# Patient Record
Sex: MALE | Race: WHITE | NOT HISPANIC OR LATINO | Employment: FULL TIME | RURAL
[De-identification: names, ages, dates, MRNs, and addresses within clinical notes are randomized per-mention and may not be internally consistent; named-entity substitution may affect disease eponyms.]

---

## 2023-02-23 ENCOUNTER — TELEPHONE (OUTPATIENT)
Dept: CARDIOLOGY | Facility: CLINIC | Age: 83
End: 2023-02-23
Payer: MEDICARE

## 2023-02-23 NOTE — TELEPHONE ENCOUNTER
Patient stopped in to discuss next FU. He was scheduled for yesterday to have a pacemaker check but it was cancelled by the office.  He says he cannot wait until April 19th when he was rescheduled. He passed out about a month ago and has been having pains around his heart that come and go.

## 2023-02-27 ENCOUNTER — OFFICE VISIT (OUTPATIENT)
Dept: CARDIOLOGY | Facility: CLINIC | Age: 83
End: 2023-02-27
Payer: MEDICARE

## 2023-02-27 VITALS
HEART RATE: 65 BPM | OXYGEN SATURATION: 96 % | HEIGHT: 68 IN | SYSTOLIC BLOOD PRESSURE: 130 MMHG | WEIGHT: 198 LBS | BODY MASS INDEX: 30.01 KG/M2 | DIASTOLIC BLOOD PRESSURE: 42 MMHG

## 2023-02-27 DIAGNOSIS — I48.0 PAROXYSMAL ATRIAL FIBRILLATION: ICD-10-CM

## 2023-02-27 DIAGNOSIS — R07.2 PRECORDIAL CHEST PAIN: ICD-10-CM

## 2023-02-27 DIAGNOSIS — R55 SYNCOPE AND COLLAPSE: ICD-10-CM

## 2023-02-27 DIAGNOSIS — I10 HYPERTENSION, UNSPECIFIED TYPE: ICD-10-CM

## 2023-02-27 DIAGNOSIS — Z95.0 PACEMAKER: Primary | ICD-10-CM

## 2023-02-27 DIAGNOSIS — N18.31 STAGE 3A CHRONIC KIDNEY DISEASE: ICD-10-CM

## 2023-02-27 DIAGNOSIS — I50.22 CHRONIC SYSTOLIC HEART FAILURE: ICD-10-CM

## 2023-02-27 PROBLEM — Z86.010 HISTORY OF COLONIC POLYPS: Status: ACTIVE | Noted: 2021-06-24

## 2023-02-27 PROBLEM — I47.1 SUPRAVENTRICULAR TACHYCARDIA: Status: ACTIVE | Noted: 2023-01-26

## 2023-02-27 PROBLEM — N40.0 ENLARGED PROSTATE: Status: ACTIVE | Noted: 2021-10-07

## 2023-02-27 PROBLEM — E66.3 OVERWEIGHT: Status: ACTIVE | Noted: 2019-12-12

## 2023-02-27 PROBLEM — Z12.11 ENCOUNTER FOR SCREENING FOR MALIGNANT NEOPLASM OF COLON: Status: ACTIVE | Noted: 2019-09-26

## 2023-02-27 PROBLEM — C44.310 BASAL CELL CARCINOMA OF FACE: Status: ACTIVE | Noted: 2019-09-26

## 2023-02-27 PROBLEM — Z86.0100 HISTORY OF COLONIC POLYPS: Status: ACTIVE | Noted: 2021-06-24

## 2023-02-27 PROBLEM — R50.9 PYREXIA OF UNKNOWN ORIGIN: Status: ACTIVE | Noted: 2022-03-15

## 2023-02-27 PROBLEM — R35.1 NOCTURIA: Status: ACTIVE | Noted: 2021-10-07

## 2023-02-27 PROBLEM — Z91.81 HISTORY OF FALL: Status: ACTIVE | Noted: 2019-09-26

## 2023-02-27 PROBLEM — R47.9 DIFFICULTY SPEAKING: Status: ACTIVE | Noted: 2017-06-29

## 2023-02-27 PROBLEM — I47.10 SUPRAVENTRICULAR TACHYCARDIA: Status: ACTIVE | Noted: 2023-01-26

## 2023-02-27 PROCEDURE — 99214 OFFICE O/P EST MOD 30 MIN: CPT | Performed by: INTERNAL MEDICINE

## 2023-02-27 PROCEDURE — 93280 PM DEVICE PROGR EVAL DUAL: CPT | Performed by: INTERNAL MEDICINE

## 2023-02-27 RX ORDER — BUMETANIDE 1 MG/1
0.5 TABLET ORAL DAILY
Qty: 90 TABLET | Refills: 3 | Status: SHIPPED | OUTPATIENT
Start: 2023-02-27

## 2023-02-27 RX ORDER — AMIODARONE HYDROCHLORIDE 200 MG/1
TABLET ORAL EVERY 24 HOURS
COMMUNITY

## 2023-02-27 RX ORDER — LOSARTAN POTASSIUM 100 MG/1
TABLET ORAL EVERY 24 HOURS
COMMUNITY

## 2023-02-27 RX ORDER — DILTIAZEM HYDROCHLORIDE 300 MG/1
CAPSULE, EXTENDED RELEASE ORAL EVERY 24 HOURS
COMMUNITY

## 2023-02-27 RX ORDER — BUMETANIDE 1 MG/1
TABLET ORAL EVERY 24 HOURS
COMMUNITY
End: 2023-02-27 | Stop reason: SDUPTHER

## 2023-02-27 NOTE — PROGRESS NOTES
[facility] Provider Note     Date:   2023   Name: Jeremias Cross  :   1940  PCP: Gaby Rosen MD    Chief Complaint   Patient presents with   • Follow-up     Pacemaker check    • Loss of Consciousness       Subjective     History of Present Illness  Jeremias Cross is a 82 y.o. male who presents today for routine follow-up.  Pacemaker check shows good battery life and lead function.  He reports an episode of syncope a few weeks ago.  He had some A-fib on his pacemaker check but this did not correlate with the day he passed out.  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  He stumbled and almost fell but was able to catch himself.  He also had some chest pain intermittently lately.  He describes it as a stinging pain above his left breast.  He has not been short of breath.    No Known Allergies    Current Outpatient Medications:   •  amiodarone (PACERONE) 200 MG tablet, Daily., Disp: , Rfl:   •  apixaban (ELIQUIS) 2.5 MG tablet tablet, Every 12 (Twelve) Hours., Disp: , Rfl:   •  bumetanide (BUMEX) 1 MG tablet, Take 0.5 tablets by mouth Daily., Disp: 90 tablet, Rfl: 3  •  dilTIAZem (TIAZAC) 300 MG 24 hr capsule, Daily., Disp: , Rfl:   •  losartan (COZAAR) 100 MG tablet, Daily., Disp: , Rfl:   •  potassium chloride in dextrose solution, potassium chloride  Take 10 MEQ po daily, Disp: , Rfl:     Past Medical History:   Diagnosis Date   • Cataracts, bilateral    • Congestive heart failure (HCC)    • Hiatal hernia    • Hypertension    • Kidney disease    • Other hypersomnia    • Prostate disorder    • Skin cancer       Past Surgical History:   Procedure Laterality Date   • INGUINAL HERNIA REPAIR Bilateral    • PARATHYROIDECTOMY     • PROSTATECTOMY     • SKIN CANCER EXCISION     • TONSILLECTOMY       Family History   Problem Relation Age of Onset   • Asthma Mother    • Emphysema Mother    • Hypertension Brother    • Benign prostatic hyperplasia  "Brother    • Arthritis Brother      Social History     Socioeconomic History   • Marital status: Single   Tobacco Use   • Smoking status: Never   Substance and Sexual Activity   • Alcohol use: Not Currently   • Drug use: Not Currently       Objective     Vital Signs:  /42 (BP Location: Left arm)   Pulse 65   Ht 172.7 cm (68\")   Wt 89.8 kg (198 lb)   SpO2 96%   BMI 30.11 kg/m²   Estimated body mass index is 30.11 kg/m² as calculated from the following:    Height as of this encounter: 172.7 cm (68\").    Weight as of this encounter: 89.8 kg (198 lb).             Physical Exam  Constitutional:       Appearance: Normal appearance. He is well-developed.   HENT:      Head: Normocephalic and atraumatic.   Eyes:      General: No scleral icterus.     Pupils: Pupils are equal, round, and reactive to light.   Neck:      Vascular: No carotid bruit.   Cardiovascular:      Rate and Rhythm: Normal rate and regular rhythm.      Pulses: Normal pulses.           Radial pulses are 2+ on the right side and 2+ on the left side.        Dorsalis pedis pulses are 2+ on the right side and 2+ on the left side.        Posterior tibial pulses are 2+ on the right side and 2+ on the left side.      Heart sounds: Normal heart sounds. No murmur heard.  Pulmonary:      Breath sounds: Normal breath sounds. No wheezing or rhonchi.   Musculoskeletal:      Right lower leg: No edema.      Left lower leg: No edema.   Skin:     Capillary Refill: Capillary refill takes less than 2 seconds.      Coloration: Skin is not cyanotic.      Nails: There is no clubbing.   Neurological:      Mental Status: He is alert and oriented to person, place, and time.      Motor: No weakness.      Gait: Gait normal.   Psychiatric:         Mood and Affect: Mood normal.         Behavior: Behavior is cooperative.         Thought Content: Thought content normal.         Cognition and Memory: Memory normal.                     Assessment and Plan     Diagnoses and all " orders for this visit:    1. Pacemaker (Primary)  Assessment & Plan:  [facility] pacemaker in place. Good battery life and lead function on interrogation today no changes made.      2. Syncope and collapse  Assessment & Plan:  Unclear cause.  Check CT head.  Pacemaker working well.  Going down on diuretics.  Blood pressure stable.  Order labs.    Orders:  -     CT Head With & Without Contrast; Future  -     Stress Test With Myocardial Perfusion One Day; Future  -     Comprehensive Metabolic Panel; Future  -     CBC & Differential; Future  -     TSH Rfx On Abnormal To Free T4; Future    3. Paroxysmal atrial fibrillation (HCC)  Assessment & Plan:  On anticoagulation.  Brief episodes on pacemaker.  Fairly asymptomatic.  Does not correlate to his syncope.    Orders:  -     CT Head With & Without Contrast; Future  -     Stress Test With Myocardial Perfusion One Day; Future  -     Comprehensive Metabolic Panel; Future  -     CBC & Differential; Future  -     TSH Rfx On Abnormal To Free T4; Future    4. Hypertension, unspecified type  Assessment & Plan:  At goal.  Continue plan of care.      5. Stage 3a chronic kidney disease (HCC)  -     bumetanide (BUMEX) 1 MG tablet; Take 0.5 tablets by mouth Daily.  Dispense: 90 tablet; Refill: 3    6. Chronic systolic heart failure (HCC)  Assessment & Plan:  March 7, 2022 TTE reviewed which shows moderate to severe mitral regurgitation, thickened mitral valve leaflets mild, mildly thick aortic valve leaflets, no aortic regurgitation, moderate tricuspid regurgitation, mildly dilated left atrium, mild left ventricular hypertrophy, EF 45%.  Update next visit.    Orders:  -     bumetanide (BUMEX) 1 MG tablet; Take 0.5 tablets by mouth Daily.  Dispense: 90 tablet; Refill: 3    7. Precordial chest pain  Assessment & Plan:  Intermittent chest discomfort.  Pinpoint sticking.  We will update stress testing.  Has not had 1 prior to pacemaker because was placed more urgently.    Orders:  -      Stress Test With Myocardial Perfusion One Day; Future                Follow Up  Return in about 2 weeks (around 3/13/2023) for Recheck follow up after testing.  Patient was given instructions and counseling regarding his condition or for health maintenance advice. Please see specific information pulled into the AVS if appropriate.

## 2023-02-27 NOTE — ASSESSMENT & PLAN NOTE
March 7, 2022 TTE reviewed which shows moderate to severe mitral regurgitation, thickened mitral valve leaflets mild, mildly thick aortic valve leaflets, no aortic regurgitation, moderate tricuspid regurgitation, mildly dilated left atrium, mild left ventricular hypertrophy, EF 45%.  Update next visit.

## 2023-02-27 NOTE — ASSESSMENT & PLAN NOTE
[facility] pacemaker in place. Good battery life and lead function on interrogation today no changes made.

## 2023-02-27 NOTE — ASSESSMENT & PLAN NOTE
Intermittent chest discomfort.  Pinpoint sticking.  We will update stress testing.  Has not had 1 prior to pacemaker because was placed more urgently.

## 2023-02-27 NOTE — ASSESSMENT & PLAN NOTE
On anticoagulation.  Brief episodes on pacemaker.  Fairly asymptomatic.  Does not correlate to his syncope.

## 2023-02-27 NOTE — ASSESSMENT & PLAN NOTE
Unclear cause.  Check CT head.  Pacemaker working well.  Going down on diuretics.  Blood pressure stable.  Order labs.

## 2023-03-01 ENCOUNTER — OUTSIDE FACILITY SERVICE (OUTPATIENT)
Dept: CARDIOLOGY | Facility: CLINIC | Age: 83
End: 2023-03-01
Payer: MEDICARE

## 2023-03-01 DIAGNOSIS — I48.0 PAROXYSMAL ATRIAL FIBRILLATION: ICD-10-CM

## 2023-03-01 DIAGNOSIS — R55 SYNCOPE AND COLLAPSE: ICD-10-CM

## 2023-03-01 PROCEDURE — 93018 CV STRESS TEST I&R ONLY: CPT | Performed by: INTERNAL MEDICINE

## 2023-03-01 PROCEDURE — 78452 HT MUSCLE IMAGE SPECT MULT: CPT | Performed by: INTERNAL MEDICINE

## 2023-04-19 ENCOUNTER — OFFICE VISIT (OUTPATIENT)
Dept: CARDIOLOGY | Facility: CLINIC | Age: 83
End: 2023-04-19
Payer: MEDICARE

## 2023-04-19 VITALS
HEIGHT: 68 IN | SYSTOLIC BLOOD PRESSURE: 138 MMHG | OXYGEN SATURATION: 97 % | DIASTOLIC BLOOD PRESSURE: 66 MMHG | WEIGHT: 200 LBS | HEART RATE: 63 BPM | BODY MASS INDEX: 30.31 KG/M2

## 2023-04-19 DIAGNOSIS — G91.9 HYDROCEPHALUS, UNSPECIFIED TYPE: ICD-10-CM

## 2023-04-19 DIAGNOSIS — I50.22 CHRONIC SYSTOLIC HEART FAILURE: ICD-10-CM

## 2023-04-19 DIAGNOSIS — R07.2 PRECORDIAL CHEST PAIN: ICD-10-CM

## 2023-04-19 DIAGNOSIS — I48.0 PAROXYSMAL ATRIAL FIBRILLATION: ICD-10-CM

## 2023-04-19 DIAGNOSIS — R55 SYNCOPE AND COLLAPSE: Primary | ICD-10-CM

## 2023-04-19 DIAGNOSIS — N18.31 STAGE 3A CHRONIC KIDNEY DISEASE: ICD-10-CM

## 2023-04-19 DIAGNOSIS — I34.0 NONRHEUMATIC MITRAL VALVE REGURGITATION: ICD-10-CM

## 2023-04-19 RX ORDER — BUMETANIDE 0.5 MG/1
0.5 TABLET ORAL DAILY
Qty: 90 TABLET | Refills: 3 | Status: SHIPPED | OUTPATIENT
Start: 2023-04-19

## 2023-04-20 NOTE — PROGRESS NOTES
[facility] Provider Note     Date:   2023   Name: Jeremias Cross  :   1940  PCP: Gaby Rosen MD    Chief Complaint   Patient presents with   • Follow-up     Karen results   • Pacemaker Check       Subjective     History of Present Illness  Jeremias Cross is a 82 y.o. male who presents today for follow-up on syncope and chest pain.  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  Pacemaker was checked and showed no battery or lead issues.  He had had some mild stinging chest pain but it has since resolved.  His stress test was normal.  He denies any worsening lower extremity edema or shortness of breath.  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    Cardiology/Sleep History  1. Atrial fibrillation - paroxysmal  2. Sinus pauses s/p DC PM 2022 [facility]  3. Hypertension  4. Congestive heart failure - EF 45% on echo 3/2-  5. Mitral regurgitation- moderate 3/2022  6. Kidney disease    No Known Allergies    Current Outpatient Medications:   •  amiodarone (PACERONE) 200 MG tablet, Daily., Disp: , Rfl:   •  apixaban (ELIQUIS) 2.5 MG tablet tablet, Every 12 (Twelve) Hours., Disp: , Rfl:   •  bumetanide (BUMEX) 0.5 MG tablet, Take 1 tablet by mouth Daily., Disp: 90 tablet, Rfl: 3  •  dilTIAZem (TIAZAC) 300 MG 24 hr capsule, Daily., Disp: , Rfl:   •  losartan (COZAAR) 100 MG tablet, Daily., Disp: , Rfl:   •  potassium chloride in dextrose solution, potassium chloride  Take 10 MEQ po daily, Disp: , Rfl:     Past Medical History:   Diagnosis Date   • Cataracts, bilateral    • Congestive heart failure    • Hiatal hernia    • Hypertension    • Kidney disease    • Other hypersomnia    • Prostate disorder    • Skin cancer       Past Surgical History:   Procedure Laterality Date   • INGUINAL HERNIA REPAIR Bilateral    • PARATHYROIDECTOMY     • PROSTATECTOMY     • SKIN CANCER EXCISION     • TONSILLECTOMY    "    Family History   Problem Relation Age of Onset   • Asthma Mother    • Emphysema Mother    • Hypertension Brother    • Benign prostatic hyperplasia Brother    • Arthritis Brother      Social History     Socioeconomic History   • Marital status: Single   Tobacco Use   • Smoking status: Never   Substance and Sexual Activity   • Alcohol use: Not Currently   • Drug use: Not Currently       Objective     Vital Signs:  /66 (BP Location: Left arm)   Pulse 63   Ht 172.7 cm (68\")   Wt 90.7 kg (200 lb)   SpO2 97%   BMI 30.41 kg/m²   Estimated body mass index is 30.41 kg/m² as calculated from the following:    Height as of this encounter: 172.7 cm (68\").    Weight as of this encounter: 90.7 kg (200 lb).       BMI is >= 30 and <35. (Class 1 Obesity). The following options were offered after discussion;: referral to primary care      Physical Exam  Constitutional:       Appearance: Normal appearance. He is well-developed.   HENT:      Head: Normocephalic and atraumatic.   Eyes:      General: No scleral icterus.     Pupils: Pupils are equal, round, and reactive to light.   Neck:      Vascular: No carotid bruit.   Cardiovascular:      Rate and Rhythm: Normal rate and regular rhythm.      Pulses: Normal pulses.           Radial pulses are 2+ on the right side and 2+ on the left side.        Dorsalis pedis pulses are 2+ on the right side and 2+ on the left side.        Posterior tibial pulses are 2+ on the right side and 2+ on the left side.      Heart sounds: Normal heart sounds. No murmur heard.  Pulmonary:      Breath sounds: Normal breath sounds. No wheezing or rhonchi.   Musculoskeletal:      Right lower leg: No edema.      Left lower leg: No edema.   Skin:     Capillary Refill: Capillary refill takes less than 2 seconds.      Coloration: Skin is not cyanotic.      Nails: There is no clubbing.   Neurological:      Mental Status: He is alert and oriented to person, place, and time.      Motor: No weakness.      " Gait: Gait normal.   Psychiatric:         Mood and Affect: Mood normal.         Behavior: Behavior is cooperative.         Thought Content: Thought content normal.         Cognition and Memory: Memory normal.           Radiologic studies reviewed: CT head from [facility] and Labs reviewed: Lab work from [facility]          Assessment and Plan     Diagnoses and all orders for this visit:    1. Syncope and collapse (Primary)  Comments:  Cause remains unclear.  Abnormal CT head will refer to neurology.  See below.  Orders:  -     Ambulatory Referral to Neurology    2. Hydrocephalus, unspecified type  Comments:  Unclear if this is age-related atrophy or true hydrocephalus.  Refer to neurology for further evaluation.  Orders:  -     Ambulatory Referral to Neurology    3. Precordial chest pain  Comments:  Normal nuclear stress test.  Pain is resolved.  Feel like this is noncardiac.    4. Stage 3a chronic kidney disease  Comments:  Stable.  Makes angiography less desirable.  Orders:  -     bumetanide (BUMEX) 0.5 MG tablet; Take 1 tablet by mouth Daily.  Dispense: 90 tablet; Refill: 3    5. Chronic systolic heart failure  Comments:  Volume status stable.  Need follow-up echo at next appointment.  Orders:  -     bumetanide (BUMEX) 0.5 MG tablet; Take 1 tablet by mouth Daily.  Dispense: 90 tablet; Refill: 3    6. Nonrheumatic mitral valve regurgitation  Comments:  Follow-up echo at next appointment.  Orders:  -     Adult Transthoracic Echo Complete W/ Cont if Necessary Per Protocol; Future    7. Paroxysmal atrial fibrillation  Comments:  Only 2% A-fib with one 9-hour episode.  Continue amiodarone.  May try to wean off at next visit.  Labs reviewed.        Recommendations: ER if symptoms increase and Report if any new/changing symptoms immediately          Follow Up  Return in about 4 months (around 8/19/2023) for PM/ICD check [facility].  Patient was given instructions and counseling regarding his condition or for  health maintenance advice. Please see specific information pulled into the AVS if appropriate.

## 2023-04-21 DIAGNOSIS — R55 SYNCOPE AND COLLAPSE: ICD-10-CM

## 2023-04-21 DIAGNOSIS — I48.0 PAROXYSMAL ATRIAL FIBRILLATION: ICD-10-CM

## 2023-08-23 ENCOUNTER — OFFICE VISIT (OUTPATIENT)
Dept: NEUROLOGY | Facility: CLINIC | Age: 83
End: 2023-08-23
Payer: MEDICARE

## 2023-08-23 VITALS
OXYGEN SATURATION: 96 % | DIASTOLIC BLOOD PRESSURE: 64 MMHG | SYSTOLIC BLOOD PRESSURE: 138 MMHG | WEIGHT: 200 LBS | HEIGHT: 68 IN | BODY MASS INDEX: 30.31 KG/M2 | HEART RATE: 51 BPM

## 2023-08-23 DIAGNOSIS — R90.89 ABNORMAL CT OF BRAIN: Primary | ICD-10-CM

## 2023-08-23 PROBLEM — J81.0 ACUTE PULMONARY EDEMA: Status: ACTIVE | Noted: 2022-03-15

## 2023-08-23 PROCEDURE — 1159F MED LIST DOCD IN RCRD: CPT | Performed by: PSYCHIATRY & NEUROLOGY

## 2023-08-23 PROCEDURE — 1160F RVW MEDS BY RX/DR IN RCRD: CPT | Performed by: PSYCHIATRY & NEUROLOGY

## 2023-08-23 PROCEDURE — 3078F DIAST BP <80 MM HG: CPT | Performed by: PSYCHIATRY & NEUROLOGY

## 2023-08-23 PROCEDURE — 99204 OFFICE O/P NEW MOD 45 MIN: CPT | Performed by: PSYCHIATRY & NEUROLOGY

## 2023-08-23 PROCEDURE — 3075F SYST BP GE 130 - 139MM HG: CPT | Performed by: PSYCHIATRY & NEUROLOGY

## 2023-08-23 RX ORDER — POTASSIUM CHLORIDE 750 MG/1
10 TABLET, FILM COATED, EXTENDED RELEASE ORAL ONCE
COMMUNITY

## 2023-08-23 NOTE — PROGRESS NOTES
Subjective:    CC: Jeremias Cross is seen today in consultation at the request of Negin Valera MD for Syncope (With fall) and hydrocephalus       HPI:  Patient is a 82-year-old male with past medical history of paroxysmal atrial fibrillation, history of pacemaker placement referred to the clinic for evaluation of possible hydrocephalus and abnormal CT.  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  He reports that he remembers going down to the ground and once he was not drunk, within few seconds, he regained consciousness.  He did not have any associated tongue bite or loss of bowel or bladder control.  He did not have any associated confusion after the episode.  Following this, he reports that he has not had any more episodes of passing out.  Because of this episode, he underwent CT head without contrast at [facility].  I do not have actual images for me to review but based on the report, it showed dilatation of ventricles out of proportion to brain atrophy concerning for hydrocephalus.  Today upon further questioning, he denies any problems with walking, balance issues, no problems with memory and denies any problems with urinary control.    The following portions of the patient's history were reviewed today and updated as of 08/23/2023  : allergies, social history, and problem list.  This document will be scanned to patient's chart.      Current Outpatient Medications:     amiodarone (PACERONE) 200 MG tablet, Daily., Disp: , Rfl:     apixaban (ELIQUIS) 2.5 MG tablet tablet, Every 12 (Twelve) Hours., Disp: , Rfl:     bumetanide (BUMEX) 0.5 MG tablet, Take 1 tablet by mouth Daily., Disp: 90 tablet, Rfl: 3    dilTIAZem (TIAZAC) 300 MG 24 hr capsule, Daily., Disp: , Rfl:     losartan (COZAAR) 100 MG tablet, Daily., Disp: , Rfl:     potassium chloride 10 MEQ CR tablet, Take 1 tablet by mouth 1 (One) Time., Disp: , Rfl:    Past  "Medical History:   Diagnosis Date    Cataracts, bilateral     Congestive heart failure     Hiatal hernia     Hypertension     Kidney disease     Other hypersomnia     Prostate disorder     Skin cancer       Past Surgical History:   Procedure Laterality Date    INGUINAL HERNIA REPAIR Bilateral     PARATHYROIDECTOMY      PROSTATECTOMY      SKIN CANCER EXCISION      TONSILLECTOMY        Family History   Problem Relation Age of Onset    Asthma Mother     Emphysema Mother     Hypertension Brother     Benign prostatic hyperplasia Brother     Arthritis Brother       Review of Systems    All other systems reviewed and are negative     Objective:    /64   Pulse 51   Ht 172.7 cm (67.99\")   Wt 90.7 kg (200 lb) Comment: pt reported  SpO2 96%   BMI 30.42 kg/mý     Neurology Exam:    General apperance: NAD.     Mental status: Alert, awake and oriented to time place and person.    Language and Speech: No aphasia or dysarthria.    Naming , Repitition and Comprehension:  Can name objects, repeat a sentence and follow commands. Speech is clear and fluent with good repetition, comprehension, and naming.    Cranial Nerves:   CN II: Visual fields are full. Intact. Fundi - Normal, No papillederma, Pupils - CANDIDA  CN III, IV and VI: Extraocular movements are intact. Normal saccades.   CN V: Facial sensation is intact.   CN VII: Muscles of facial expression reveal no asymmetry. Intact.   CN VIII: Hearing is intact. Whispered voice intact.   CN IX and X: Palate elevates symmetrically. Intact  CN XI: Shoulder shrug is intact.   CN XII: Tongue is midline without evidence of atrophy or fasciculation.     Motor:  Right UE muscle strength 5/5. Normal tone.     Left UE muscle strength 5/5. Normal tone.      Right LE muscle strength5/5. Normal tone.     Left LE muscle strength 5/5. Normal tone.      Sensory: Normal light touch, vibration and pinprick sensation bilaterally.    DTRs: 2+ bilaterally in upper and lower " extremities.    Babinski: Negative bilaterally.    Co-ordination: Normal finger-to-nose, heel to shin B/L.    Rhomberg: Negative.    Gait: Normal.    Cardiovascular: Regular rate and rhythm without murmur, gallop or rub.    Assessment and Plan:  1. Abnormal CT of brain  Patient with an episode of syncope back in January/February 2023.  Unknown etiology.  He does have a pacemaker and pacemaker interrogation did not reveal any malfunctioning during that time.  Based on the symptoms, I have assured him that he did not have a seizure.  He underwent CT head without contrast in April 2023 because of the syncopal episode.  I do not have actual images for me to review but based on the report, CT head showed ventricular megaly out of proportion to brain atrophy concerning for hydrocephalus.  On my examination, he did not have any signs suggestive of normal pressure hydrocephalus and hence I do not recommend any further neuroimaging or testing at this point in time.  I would like to see him back in clinic in 1 year for follow-up.    Return in about 1 year (around 8/23/2024).     Constantin Ricardo MD      Note to patient: The 21st Century Cures Act makes medical notes like these available to patients in the interest of transparency. However, be advised this is a medical document. It is intended as peer to peer communication. It is written in medical language and may contain abbreviations or verbiage that are unfamiliar. It may appear blunt or direct. Medical documents are intended to carry relevant information, facts as evident, and the clinical opinion of the physician.

## 2023-09-06 ENCOUNTER — OFFICE VISIT (OUTPATIENT)
Dept: CARDIOLOGY | Facility: CLINIC | Age: 83
End: 2023-09-06
Payer: MEDICARE

## 2023-09-06 VITALS
BODY MASS INDEX: 30.62 KG/M2 | OXYGEN SATURATION: 96 % | WEIGHT: 202 LBS | HEART RATE: 65 BPM | DIASTOLIC BLOOD PRESSURE: 67 MMHG | SYSTOLIC BLOOD PRESSURE: 138 MMHG | HEIGHT: 68 IN

## 2023-09-06 DIAGNOSIS — R55 SYNCOPE AND COLLAPSE: ICD-10-CM

## 2023-09-06 DIAGNOSIS — Z95.0 PACEMAKER: ICD-10-CM

## 2023-09-06 DIAGNOSIS — I48.0 PAROXYSMAL ATRIAL FIBRILLATION: Primary | ICD-10-CM

## 2023-09-06 DIAGNOSIS — R07.2 PRECORDIAL CHEST PAIN: ICD-10-CM

## 2023-09-06 NOTE — PROGRESS NOTES
[facility] Provider Note     Date:   2023   Name: Jeremias Cross  :   1940  PCP: System, Provider Not In    Chief Complaint   Patient presents with    Pacemaker Check       Subjective     History of Present Illness  Jeremias Cross is a 82 y.o. male who presents today for follow up sycope and atrial fibrillation. He does have some chest pain on the left side when coughs and when he moves. No syncope. Some shortness of breath with exertion.  No worse. No LE edema.  A-fib burden 2%.  Longest episode 5-1/2 hours.  No high ventricular rates.  We will stop amiodarone.    Cardiology/Sleep History  1. Atrial fibrillation - paroxysmal  2. Sinus pauses s/p DC PM 2022 [facility]  3. Hypertension  4. Congestive heart failure - EF 45% on echo 3/2-  5. Mitral regurgitation- moderate 3/2022  6. Kidney disease    No Known Allergies    Current Outpatient Medications:     apixaban (ELIQUIS) 2.5 MG tablet tablet, Every 12 (Twelve) Hours., Disp: , Rfl:     bumetanide (BUMEX) 0.5 MG tablet, Take 1 tablet by mouth Daily., Disp: 90 tablet, Rfl: 3    dilTIAZem (TIAZAC) 300 MG 24 hr capsule, Daily., Disp: , Rfl:     losartan (COZAAR) 100 MG tablet, Daily., Disp: , Rfl:     potassium chloride 10 MEQ CR tablet, Take 1 tablet by mouth 1 (One) Time., Disp: , Rfl:     Past Medical History:   Diagnosis Date    Cataracts, bilateral     Congestive heart failure     Hiatal hernia     Hypertension     Kidney disease     Other hypersomnia     Prostate disorder     Skin cancer       Past Surgical History:   Procedure Laterality Date    INGUINAL HERNIA REPAIR Bilateral     PARATHYROIDECTOMY      PROSTATECTOMY      SKIN CANCER EXCISION      TONSILLECTOMY       Family History   Problem Relation Age of Onset    Asthma Mother     Emphysema Mother     Hypertension Brother     Benign prostatic hyperplasia Brother     Arthritis Brother      Social History     Socioeconomic History    Marital status: Single   Tobacco Use     "Smoking status: Never     Passive exposure: Never    Smokeless tobacco: Never   Vaping Use    Vaping Use: Never used   Substance and Sexual Activity    Alcohol use: Not Currently    Drug use: Not Currently    Sexual activity: Defer       Objective     Vital Signs:  /67   Pulse 65   Ht 172.7 cm (68\")   Wt 91.6 kg (202 lb)   SpO2 96%   BMI 30.71 kg/m²   Estimated body mass index is 30.71 kg/m² as calculated from the following:    Height as of this encounter: 172.7 cm (68\").    Weight as of this encounter: 91.6 kg (202 lb).               Physical Exam  Vitals reviewed.   Constitutional:       General: He is not in acute distress.     Appearance: Normal appearance.   HENT:      Head: Normocephalic and atraumatic.      Mouth/Throat:      Mouth: Mucous membranes are moist.   Eyes:      Conjunctiva/sclera: Conjunctivae normal.   Neck:      Vascular: No carotid bruit.   Cardiovascular:      Rate and Rhythm: Normal rate and regular rhythm.      Pulses: Normal pulses.      Heart sounds: Normal heart sounds. No murmur heard.  Pulmonary:      Effort: Pulmonary effort is normal. No respiratory distress.      Breath sounds: Normal breath sounds. No wheezing or rhonchi.   Abdominal:      General: Abdomen is flat.      Palpations: Abdomen is soft.   Musculoskeletal:      Cervical back: Normal range of motion and neck supple.      Right lower leg: No edema.      Left lower leg: No edema.   Skin:     General: Skin is warm and dry.      Coloration: Skin is not jaundiced.   Neurological:      General: No focal deficit present.      Mental Status: He is alert and oriented to person, place, and time. Mental status is at baseline.      GCS: GCS eye subscore is 4. GCS verbal subscore is 5. GCS motor subscore is 6.      Cranial Nerves: No cranial nerve deficit.      Motor: No weakness.      Gait: Gait normal.   Psychiatric:         Mood and Affect: Mood and affect normal. Mood is not anxious.         Speech: Speech normal.        "  Behavior: Behavior normal.                   Assessment and Plan     Diagnoses and all orders for this visit:    1. Paroxysmal atrial fibrillation (Primary)  Comments:  A-fib burden low.  Stop amiodarone.  Risks outweigh benefit    2. Pacemaker  Comments:  Good battery and lead function.  Check for A-fib burden today    3. Precordial chest pain  Comments:  Atypical.  Noncardiac.    4. Syncope and collapse  Comments:  None further.  Watch blood pressures.        Recommendations: ER if symptoms increase and Report if any new/changing symptoms immediately              Follow Up  Return in about 4 months (around 1/6/2024) for PM/ICD check.    Negin Valera MD   09/06/2023     Please note that this explicitly excludes time spent on other separate billable services such as performing procedures or test interpretation, when applicable.    This note was created using dictation software which occasionally transcribes nonsensical phrases. Please contact the provider if any clarification is needed.

## 2023-11-16 ENCOUNTER — TELEPHONE (OUTPATIENT)
Dept: CARDIOLOGY | Facility: CLINIC | Age: 83
End: 2023-11-16
Payer: MEDICARE

## 2023-11-16 NOTE — TELEPHONE ENCOUNTER
Pt here today requesting samples of Eliquis 2.5mg one twice daily.  Last seen by Dr. Valera on 9/6/2023 and on active list; follow up scheduled for 1/17/2024.  Okay to give samples?

## 2024-01-10 ENCOUNTER — TELEPHONE (OUTPATIENT)
Dept: CARDIOLOGY | Facility: CLINIC | Age: 84
End: 2024-01-10

## 2024-01-10 NOTE — TELEPHONE ENCOUNTER
Patient is needing to reschedule his device check for later in the day. Can you call patient and reschedule? Thank you

## 2024-01-10 NOTE — TELEPHONE ENCOUNTER
Name: Jeremisa Cross    Relationship: Self    Best Callback Number: [phone]     Incoming call to the Hub, requesting to  Reschedule their Device Check appointment on 1/17/24.     Per Hub workflow, further review is needed before the task can be completed.    Result of Call: Please reach out to the patient to reschedule    REQUESTING A LATER TIME ON THE SAME DAY

## 2024-02-21 ENCOUNTER — OFFICE VISIT (OUTPATIENT)
Dept: CARDIOLOGY | Facility: CLINIC | Age: 84
End: 2024-02-21
Payer: MEDICARE

## 2024-02-21 VITALS
HEIGHT: 68 IN | HEART RATE: 61 BPM | WEIGHT: 207.9 LBS | OXYGEN SATURATION: 97 % | BODY MASS INDEX: 31.51 KG/M2 | DIASTOLIC BLOOD PRESSURE: 62 MMHG | SYSTOLIC BLOOD PRESSURE: 138 MMHG

## 2024-02-21 DIAGNOSIS — R55 SYNCOPE AND COLLAPSE: ICD-10-CM

## 2024-02-21 DIAGNOSIS — I48.0 PAROXYSMAL ATRIAL FIBRILLATION: Primary | ICD-10-CM

## 2024-02-21 DIAGNOSIS — Z95.0 PACEMAKER: ICD-10-CM

## 2024-02-21 RX ORDER — METOPROLOL SUCCINATE 25 MG/1
25 TABLET, EXTENDED RELEASE ORAL DAILY
Qty: 30 TABLET | Refills: 11 | Status: SHIPPED | OUTPATIENT
Start: 2024-02-21

## 2024-02-21 RX ORDER — MULTIPLE VITAMINS W/ MINERALS TAB 9MG-400MCG
1 TAB ORAL DAILY
COMMUNITY

## 2024-02-21 RX ORDER — ASPIRIN 81 MG/1
81 TABLET, CHEWABLE ORAL DAILY
COMMUNITY

## 2024-02-21 RX ORDER — ALBUTEROL SULFATE 90 UG/1
1 AEROSOL, METERED RESPIRATORY (INHALATION) EVERY 6 HOURS PRN
COMMUNITY
Start: 2023-11-24 | End: 2024-11-23

## 2024-02-21 NOTE — TELEPHONE ENCOUNTER
error   Azithromycin Counseling:  I discussed with the patient the risks of azithromycin including but not limited to GI upset, allergic reaction, drug rash, diarrhea, and yeast infections.

## 2024-02-21 NOTE — PROGRESS NOTES
[facility] Provider Note     Date:   2024   Name: Jeremias Cross  :   1940  PCP: System, Provider Not In    Chief Complaint   Patient presents with    Follow-up     Follow Up with pacemaker check   Patient watched CPR Video        Subjective     History of Present Illness  Jeremias Cross is a 83 y.o. male who presents today for 6-month follow-up and pacemaker check.  Passed out one day back in the fall, none since.  Does not feel dizzy.  Little bit of chest pain but went away when he had RSV in Nov. Now doing well.   Has no idea when he is in A-fib.  He did have 2-1/2-hour episode.  He had average heart rates above 110 during that episode.  No lower extremity edema.  No shortness of breath.    Cardiology/Sleep History  1. Atrial fibrillation - paroxysmal  2. Sinus pauses s/p DC PM 2022 [facility]  3. Hypertension  4. Congestive heart failure - EF 45% on echo 3/2-  5. Mitral regurgitation- moderate 3/2022  6. Kidney disease    No Known Allergies    Current Outpatient Medications:     apixaban (ELIQUIS) 2.5 MG tablet tablet, Take 1 tablet by mouth Every 12 (Twelve) Hours., Disp: 28 tablet, Rfl: 0    bumetanide (BUMEX) 0.5 MG tablet, Take 1 tablet by mouth Daily., Disp: 90 tablet, Rfl: 3    dilTIAZem (TIAZAC) 300 MG 24 hr capsule, Daily., Disp: , Rfl:     losartan (COZAAR) 100 MG tablet, Daily., Disp: , Rfl:     multivitamin with minerals tablet tablet, Take 1 tablet by mouth Daily., Disp: , Rfl:     potassium chloride 10 MEQ CR tablet, Take 1 tablet by mouth 1 (One) Time., Disp: , Rfl:     albuterol sulfate  (90 Base) MCG/ACT inhaler, Inhale 1 puff Every 6 (Six) Hours As Needed. (Patient not taking: Reported on 2024), Disp: , Rfl:     aspirin 81 MG chewable tablet, Chew 1 tablet Daily. Takes every other day, Disp: , Rfl:     metoprolol succinate XL (TOPROL-XL) 25 MG 24 hr tablet, Take 1 tablet by mouth Daily., Disp: 30 tablet, Rfl: 11    Past Medical History:  "  Diagnosis Date    Cataracts, bilateral     Congestive heart failure     Hiatal hernia     Hypertension     Kidney disease     Other hypersomnia     Prostate disorder     Skin cancer       Past Surgical History:   Procedure Laterality Date    INGUINAL HERNIA REPAIR Bilateral     PARATHYROIDECTOMY      PROSTATECTOMY      SKIN CANCER EXCISION      TONSILLECTOMY       Family History   Problem Relation Age of Onset    Asthma Mother     Emphysema Mother     Hypertension Brother     Benign prostatic hyperplasia Brother     Arthritis Brother      Social History     Socioeconomic History    Marital status: Single   Tobacco Use    Smoking status: Never     Passive exposure: Never    Smokeless tobacco: Never   Vaping Use    Vaping Use: Never used   Substance and Sexual Activity    Alcohol use: Not Currently    Drug use: Not Currently    Sexual activity: Defer       Objective     Vital Signs:  /62 (BP Location: Left arm, Patient Position: Sitting, Cuff Size: Adult)   Pulse 61   Ht 172.7 cm (68\")   Wt 94.3 kg (207 lb 14.4 oz)   SpO2 97%   BMI 31.61 kg/m²   Estimated body mass index is 31.61 kg/m² as calculated from the following:    Height as of this encounter: 172.7 cm (68\").    Weight as of this encounter: 94.3 kg (207 lb 14.4 oz).         Physical Exam  Vitals reviewed.   Constitutional:       General: He is not in acute distress.     Appearance: Normal appearance.   HENT:      Head: Normocephalic and atraumatic.      Mouth/Throat:      Mouth: Mucous membranes are moist.   Eyes:      Conjunctiva/sclera: Conjunctivae normal.   Neck:      Vascular: No carotid bruit.   Cardiovascular:      Rate and Rhythm: Normal rate and regular rhythm.      Pulses: Normal pulses.      Heart sounds: Normal heart sounds. No murmur heard.  Pulmonary:      Effort: Pulmonary effort is normal. No respiratory distress.      Breath sounds: Normal breath sounds. No wheezing or rhonchi.   Abdominal:      General: Abdomen is flat.      " Palpations: Abdomen is soft.   Musculoskeletal:      Cervical back: Normal range of motion and neck supple.      Right lower leg: No edema.      Left lower leg: No edema.   Skin:     General: Skin is warm and dry.      Coloration: Skin is not jaundiced.   Neurological:      General: No focal deficit present.      Mental Status: He is alert and oriented to person, place, and time. Mental status is at baseline.      GCS: GCS eye subscore is 4. GCS verbal subscore is 5. GCS motor subscore is 6.      Cranial Nerves: No cranial nerve deficit.      Motor: No weakness.      Gait: Gait normal.   Psychiatric:         Mood and Affect: Mood and affect normal. Mood is not anxious.         Speech: Speech normal.         Behavior: Behavior normal.                     Assessment and Plan     Diagnoses and all orders for this visit:    1. Paroxysmal atrial fibrillation (Primary)  Comments:  Episodes recently have a little bit of elevated heart rate.  Start metoprolol.  He is asymptomatic.  Recheck on pacemaker download 3-month.  Orders:  -     metoprolol succinate XL (TOPROL-XL) 25 MG 24 hr tablet; Take 1 tablet by mouth Daily.  Dispense: 30 tablet; Refill: 11  -     apixaban (ELIQUIS) 2.5 MG tablet tablet; Take 1 tablet by mouth Every 12 (Twelve) Hours.  Dispense: 28 tablet; Refill: 0    2. Pacemaker  Comments:  Good battery life and lead function.    3. Syncope and collapse  Comments:  1 episode more than 6 months ago.  None recently.        Recommendations: Report if any new/changing symptoms immediately      Follow Up  Return in about 3 months (around 5/21/2024) for PM/ICD check.    Negin Valera MD   02/21/2024     Please note that this explicitly excludes time spent on other separate billable services such as performing procedures or test interpretation, when applicable.    This note was created using dictation software which occasionally transcribes nonsensical phrases. Please contact the provider if any clarification is  needed.

## 2024-03-20 ENCOUNTER — TELEPHONE (OUTPATIENT)
Dept: CARDIOLOGY | Facility: CLINIC | Age: 84
End: 2024-03-20
Payer: MEDICARE

## 2024-03-20 NOTE — TELEPHONE ENCOUNTER
Mr. Cross is having skin cancer removed off of his face on Friday and was instructed to not take his blood thinner for 3 days. He is wanting to know if that is okay?

## 2024-05-09 DIAGNOSIS — I50.22 CHRONIC SYSTOLIC HEART FAILURE: ICD-10-CM

## 2024-05-09 DIAGNOSIS — N18.31 STAGE 3A CHRONIC KIDNEY DISEASE: ICD-10-CM

## 2024-05-09 RX ORDER — BUMETANIDE 1 MG/1
0.5 TABLET ORAL DAILY
Qty: 90 TABLET | Refills: 3 | OUTPATIENT
Start: 2024-05-09

## 2024-05-10 RX ORDER — BUMETANIDE 0.5 MG/1
0.5 TABLET ORAL DAILY
Qty: 90 TABLET | Refills: 0 | Status: SHIPPED | OUTPATIENT
Start: 2024-05-10

## 2024-05-29 ENCOUNTER — OFFICE VISIT (OUTPATIENT)
Dept: CARDIOLOGY | Facility: CLINIC | Age: 84
End: 2024-05-29
Payer: MEDICARE

## 2024-05-29 VITALS
DIASTOLIC BLOOD PRESSURE: 64 MMHG | HEIGHT: 68 IN | WEIGHT: 207 LBS | HEART RATE: 75 BPM | BODY MASS INDEX: 31.37 KG/M2 | SYSTOLIC BLOOD PRESSURE: 128 MMHG | OXYGEN SATURATION: 96 %

## 2024-05-29 DIAGNOSIS — I34.0 NONRHEUMATIC MITRAL VALVE REGURGITATION: ICD-10-CM

## 2024-05-29 DIAGNOSIS — Z95.0 PACEMAKER: ICD-10-CM

## 2024-05-29 DIAGNOSIS — I50.22 CHRONIC SYSTOLIC HEART FAILURE: Primary | ICD-10-CM

## 2024-05-29 NOTE — PROGRESS NOTES
[facility] Provider Note     Date:   2024   Name: Jeremias Cross  :   1940  PCP: Gaby Rosen MD    Chief Complaint   Patient presents with    Atrial Fibrillation    Pacemaker Check       Subjective     History of Present Illness  Jeremias Cross is a 83 y.o. male who presents today for routine follow-up.  He reports feeling well.  He went to the kidney doctor recently who talked to him about congestive heart failure mitral regurgitation.  He does not recall us telling him about that.  Apologize for the error.  Not sure where the miscommunication was but discussed with him today that these are things we need to monitor but have not shown any need for intervention at this time.  He also needs to watch blood pressure and continue primary prevention as needed.  He denies any lower extremity edema.  No chest pain or shortness of breath.    Cardiology/Sleep History  1. Atrial fibrillation - paroxysmal  2. Sinus pauses s/p DC PM 2022 [facility]  3. Hypertension  4. Congestive heart failure - EF 45% on echo 3/2-  5. Mitral regurgitation- moderate 3/2022  6. Kidney disease    No Known Allergies    Current Outpatient Medications:     apixaban (ELIQUIS) 2.5 MG tablet tablet, Take 1 tablet by mouth Every 12 (Twelve) Hours., Disp: 28 tablet, Rfl: 0    aspirin 81 MG chewable tablet, Chew 1 tablet Daily. Takes every other day, Disp: , Rfl:     bumetanide (BUMEX) 0.5 MG tablet, TAKE 1 TABLET BY MOUTH EVERY DAY, Disp: 90 tablet, Rfl: 0    dilTIAZem (TIAZAC) 300 MG 24 hr capsule, Daily., Disp: , Rfl:     losartan (COZAAR) 100 MG tablet, Daily., Disp: , Rfl:     metoprolol succinate XL (TOPROL-XL) 25 MG 24 hr tablet, Take 1 tablet by mouth Daily., Disp: 30 tablet, Rfl: 11    multivitamin with minerals tablet tablet, Take 1 tablet by mouth Daily., Disp: , Rfl:     potassium chloride 10 MEQ CR tablet, Take 1 tablet by mouth 1 (One) Time., Disp: , Rfl:     Past Medical History:  "  Diagnosis Date    Cataracts, bilateral     Congestive heart failure     Hiatal hernia     Hypertension     Kidney disease     Other hypersomnia     Prostate disorder     Skin cancer       Past Surgical History:   Procedure Laterality Date    INGUINAL HERNIA REPAIR Bilateral     PARATHYROIDECTOMY      PROSTATECTOMY      SKIN CANCER EXCISION      TONSILLECTOMY       Family History   Problem Relation Age of Onset    Asthma Mother     Emphysema Mother     Hypertension Brother     Benign prostatic hyperplasia Brother     Arthritis Brother      Social History     Socioeconomic History    Marital status: Single   Tobacco Use    Smoking status: Never     Passive exposure: Never    Smokeless tobacco: Never   Vaping Use    Vaping status: Never Used   Substance and Sexual Activity    Alcohol use: Not Currently    Drug use: Not Currently    Sexual activity: Defer       Objective     Vital Signs:  /64 (BP Location: Right arm, Patient Position: Sitting)   Pulse 75   Ht 172.7 cm (68\")   Wt 93.9 kg (207 lb)   SpO2 96%   BMI 31.47 kg/m²   Estimated body mass index is 31.47 kg/m² as calculated from the following:    Height as of this encounter: 172.7 cm (68\").    Weight as of this encounter: 93.9 kg (207 lb).         Physical Exam  Vitals reviewed.   Constitutional:       General: He is not in acute distress.     Appearance: Normal appearance.   HENT:      Head: Normocephalic and atraumatic.      Mouth/Throat:      Mouth: Mucous membranes are moist.   Eyes:      Conjunctiva/sclera: Conjunctivae normal.   Neck:      Vascular: No carotid bruit.   Cardiovascular:      Rate and Rhythm: Normal rate and regular rhythm.      Pulses: Normal pulses.      Heart sounds: Normal heart sounds. No murmur heard.  Pulmonary:      Effort: Pulmonary effort is normal. No respiratory distress.      Breath sounds: Normal breath sounds. No wheezing or rhonchi.   Abdominal:      General: Abdomen is flat.      Palpations: Abdomen is soft. "   Musculoskeletal:      Cervical back: Normal range of motion and neck supple.      Right lower leg: No edema.      Left lower leg: No edema.   Skin:     General: Skin is warm and dry.      Coloration: Skin is not jaundiced.   Neurological:      General: No focal deficit present.      Mental Status: He is alert and oriented to person, place, and time. Mental status is at baseline.      GCS: GCS eye subscore is 4. GCS verbal subscore is 5. GCS motor subscore is 6.      Cranial Nerves: No cranial nerve deficit.      Motor: No weakness.      Gait: Gait normal.   Psychiatric:         Mood and Affect: Mood and affect normal. Mood is not anxious.         Speech: Speech normal.         Behavior: Behavior normal.                     Assessment and Plan     Diagnoses and all orders for this visit:    1. Chronic systolic heart failure (Primary)  Comments:  Euvolemic on exam.  Continue medical therapy.  Orders:  -     Adult Transthoracic Echo Complete W/ Cont if Necessary Per Protocol; Future    2. Nonrheumatic mitral valve regurgitation  Comments:  Moderate.  Due for follow-up at next appointment.  Orders:  -     Adult Transthoracic Echo Complete W/ Cont if Necessary Per Protocol; Future    3. Pacemaker  Comments:  Good battery life and lead function.        Recommendations: Report if any new/changing symptoms immediately      Follow Up  Return in about 3 months (around 8/29/2024) for PM/ICD check.    Negin Valera MD   05/29/2024     Please note that this explicitly excludes time spent on other separate billable services such as performing procedures or test interpretation, when applicable.    This note was created using dictation software which occasionally transcribes nonsensical phrases. Please contact the provider if any clarification is needed.

## 2024-08-15 ENCOUNTER — TELEPHONE (OUTPATIENT)
Dept: CARDIOLOGY | Facility: CLINIC | Age: 84
End: 2024-08-15
Payer: COMMERCIAL

## 2024-08-15 NOTE — TELEPHONE ENCOUNTER
Per remote transmission, patient has had a changed in his RV threshold.  RV threshold today 3.75V @ 0.5ms, impedance 560 ohms, and sensing 12.0mv. Previously threshold 1.1v@ 0.5ms, sensing 12mv and impedance 540 ohms. Only RV paces 7.5% of time. Has upcoming in office appointment 08/21/24 for follow up with device check. Note placed in appt note in regards to recent threshold change. Report in [employer] for review.

## 2024-08-15 NOTE — TELEPHONE ENCOUNTER
Please call the patient and let him know that we have seen some changes on his pacemaker remote report and that it is important that he keep his scheduled clinic appointment where we are going to check the pacemaker in office.

## 2024-08-21 ENCOUNTER — OFFICE VISIT (OUTPATIENT)
Dept: CARDIOLOGY | Facility: CLINIC | Age: 84
End: 2024-08-21
Payer: MEDICARE

## 2024-08-21 VITALS
HEIGHT: 68 IN | SYSTOLIC BLOOD PRESSURE: 122 MMHG | OXYGEN SATURATION: 96 % | BODY MASS INDEX: 30.92 KG/M2 | HEART RATE: 81 BPM | WEIGHT: 204 LBS | DIASTOLIC BLOOD PRESSURE: 60 MMHG

## 2024-08-21 DIAGNOSIS — Z95.0 PACEMAKER: Primary | ICD-10-CM

## 2024-08-21 DIAGNOSIS — I34.0 NONRHEUMATIC MITRAL VALVE REGURGITATION: ICD-10-CM

## 2024-08-21 DIAGNOSIS — I48.0 PAROXYSMAL ATRIAL FIBRILLATION: ICD-10-CM

## 2024-08-21 NOTE — PROGRESS NOTES
[facility] Provider Note     Date:   2024   Name: Jeremias Cross  :   1940  PCP: Gaby Rosen MD    Chief Complaint   Patient presents with    Follow-up     3 mo fu with PM check       Subjective     History of Present Illness  Jeremias Cross is a 83 y.o. male who presents today for PM check   Twinge of left chest pain. Occasional. Never more than seconds.  Has been there for a long time.  No trouble breathing. Has gradually gotten more short of breath over years, attributes to age.     Cardiology/Sleep History  1. Atrial fibrillation - paroxysmal  2. Sinus pauses s/p DC PM 2022 [facility]  3. Hypertension  4. Congestive heart failure - EF 45% on echo 3/2-  5. Mitral regurgitation- moderate 3/2022  6. Kidney disease    No Known Allergies    Current Outpatient Medications:     apixaban (ELIQUIS) 2.5 MG tablet tablet, Take 1 tablet by mouth Every 12 (Twelve) Hours., Disp: 28 tablet, Rfl: 0    aspirin 81 MG chewable tablet, Chew 1 tablet Daily. Takes every other day, Disp: , Rfl:     bumetanide (BUMEX) 0.5 MG tablet, TAKE 1 TABLET BY MOUTH EVERY DAY, Disp: 90 tablet, Rfl: 0    dilTIAZem (TIAZAC) 300 MG 24 hr capsule, Daily., Disp: , Rfl:     losartan (COZAAR) 100 MG tablet, Daily., Disp: , Rfl:     metoprolol succinate XL (TOPROL-XL) 25 MG 24 hr tablet, Take 1 tablet by mouth Daily., Disp: 30 tablet, Rfl: 11    multivitamin with minerals tablet tablet, Take 1 tablet by mouth Daily., Disp: , Rfl:     potassium chloride 10 MEQ CR tablet, Take 1 tablet by mouth 1 (One) Time., Disp: , Rfl:     Past Medical History:   Diagnosis Date    Cataracts, bilateral     Congestive heart failure     Hiatal hernia     Hypertension     Kidney disease     Other hypersomnia     Prostate disorder     Skin cancer       Past Surgical History:   Procedure Laterality Date    INGUINAL HERNIA REPAIR Bilateral     PARATHYROIDECTOMY      PROSTATECTOMY      SKIN CANCER EXCISION      TONSILLECTOMY    "    Family History   Problem Relation Age of Onset    Asthma Mother     Emphysema Mother     Hypertension Brother     Benign prostatic hyperplasia Brother     Arthritis Brother      Social History     Socioeconomic History    Marital status: Single   Tobacco Use    Smoking status: Never     Passive exposure: Never    Smokeless tobacco: Never   Vaping Use    Vaping status: Never Used   Substance and Sexual Activity    Alcohol use: Not Currently    Drug use: Not Currently    Sexual activity: Defer       Objective     Vital Signs:  /60 (BP Location: Left arm, Patient Position: Sitting, Cuff Size: Adult)   Pulse 81   Ht 172.7 cm (68\")   Wt 92.5 kg (204 lb)   SpO2 96%   BMI 31.02 kg/m²   Estimated body mass index is 31.02 kg/m² as calculated from the following:    Height as of this encounter: 172.7 cm (68\").    Weight as of this encounter: 92.5 kg (204 lb).         Physical Exam  Constitutional:       Appearance: Normal appearance. He is well-developed.   HENT:      Head: Normocephalic and atraumatic.   Eyes:      General: No scleral icterus.     Pupils: Pupils are equal, round, and reactive to light.   Cardiovascular:      Rate and Rhythm: Normal rate and regular rhythm.      Heart sounds: Normal heart sounds. No murmur heard.  Pulmonary:      Breath sounds: Normal breath sounds. No wheezing or rhonchi.   Musculoskeletal:      Right lower leg: No edema.      Left lower leg: No edema.   Skin:     Capillary Refill: Capillary refill takes less than 2 seconds.      Coloration: Skin is not cyanotic.      Nails: There is no clubbing.   Neurological:      Mental Status: He is alert and oriented to person, place, and time.      Motor: No weakness.      Gait: Gait normal.   Psychiatric:         Mood and Affect: Mood normal.         Behavior: Behavior is cooperative.         Thought Content: Thought content normal.         Cognition and Memory: Memory normal.                 ECG 12 Lead    Date/Time: 8/22/2024 12:32 " PM  Performed by: Negin Valera MD    Authorized by: Negin Valera MD  Comparison: compared with previous ECG from 8/4/2022  Similar to previous ECG  Rhythm: sinus rhythm  Rate: normal  Conduction: conduction normal  ST Segments: ST segments normal  T Waves: T waves normal  QRS axis: normal  Other: no other findings    Clinical impression: normal ECG           Assessment and Plan     Diagnoses and all orders for this visit:    1. Pacemaker (Primary)  Comments:  Good battery life and lead function.    2. Paroxysmal atrial fibrillation  Comments:  Asymptomatic.  Rate controlled.  Continue Eliquis.  Orders:  -     apixaban (ELIQUIS) 2.5 MG tablet tablet; Take 1 tablet by mouth Every 12 (Twelve) Hours.  Dispense: 28 tablet; Refill: 0  -     ECG 12 Lead    3. Nonrheumatic mitral valve regurgitation  Comments:  Stable on echocardiogram.  Follow-up annually.              Follow Up  Return in about 6 months (around 2/21/2025) for PM/ICD check.    Negin Valera MD   08/21/2024     Please note that this explicitly excludes time spent on other separate billable services such as performing procedures or test interpretation, when applicable.    This note was created using dictation software which occasionally transcribes nonsensical phrases. Please contact the provider if any clarification is needed.

## 2024-09-23 ENCOUNTER — TELEPHONE (OUTPATIENT)
Dept: CARDIOLOGY | Facility: CLINIC | Age: 84
End: 2024-09-23

## 2024-09-23 NOTE — TELEPHONE ENCOUNTER
Per remote transmission, patient has had a changed in his RV threshold.  RV threshold today 3.75V @ 0.5ms, impedance 560 ohms, and sensing 12.0mv. Previously threshold 1.1v@ 0.5ms, sensing 12mv and impedance 530 ohms.  Had office check on 08/21/2024 which showed RV threshold of 0.75 @ 0.5ms. Patient RV paces 13%, next in office appointment is 02/19/2025. Report is in [facility] for review.

## 2024-09-23 NOTE — TELEPHONE ENCOUNTER
Can we make a note to get another remote transmission next week and see if it is still just as high please.  Thanks

## 2024-10-24 NOTE — TELEPHONE ENCOUNTER
Reviewed remote transmission received today, Current RV threshold is 0.75V@0.5ms, impedance 510 ohms.  Full report in [employer] for review.   Thanks

## 2024-11-12 ENCOUNTER — TELEPHONE (OUTPATIENT)
Dept: CARDIOLOGY | Facility: CLINIC | Age: 84
End: 2024-11-12
Payer: COMMERCIAL

## 2024-11-12 NOTE — TELEPHONE ENCOUNTER
Called Basilio, pharmacist, from [facility] to relate that Dr. Valera was aware pt had been on both meds per written note of [facility] [place].

## 2024-11-12 NOTE — TELEPHONE ENCOUNTER
Basilio from [facility] called to verify whether patient should be on metoprolol and ditiazem at the same time.  Pharmacy stated that PCP prescribed the ditiazem and he had attempted to reach out to them but that they stated they would defer to our provider.    Pharmacy is asking for a call back at [phone].

## 2024-11-12 NOTE — TELEPHONE ENCOUNTER
Pt last seen by Dr. Valera on 8/21/2024 with follow up scheduled for 2/19/2025 @ 3:30 PM.  Please advise.

## 2025-02-10 DIAGNOSIS — I48.0 PAROXYSMAL ATRIAL FIBRILLATION: ICD-10-CM

## 2025-02-10 RX ORDER — METOPROLOL SUCCINATE 25 MG/1
25 TABLET, EXTENDED RELEASE ORAL DAILY
Qty: 30 TABLET | Refills: 11 | Status: SHIPPED | OUTPATIENT
Start: 2025-02-10

## 2025-03-19 ENCOUNTER — CLINICAL SUPPORT NO REQUIREMENTS (OUTPATIENT)
Dept: CARDIOLOGY | Facility: CLINIC | Age: 85
End: 2025-03-19
Payer: MEDICARE

## 2025-03-19 ENCOUNTER — OFFICE VISIT (OUTPATIENT)
Dept: CARDIOLOGY | Facility: CLINIC | Age: 85
End: 2025-03-19
Payer: MEDICARE

## 2025-03-19 VITALS
WEIGHT: 194 LBS | HEART RATE: 65 BPM | SYSTOLIC BLOOD PRESSURE: 136 MMHG | BODY MASS INDEX: 29.4 KG/M2 | OXYGEN SATURATION: 95 % | HEIGHT: 68 IN | DIASTOLIC BLOOD PRESSURE: 70 MMHG

## 2025-03-19 DIAGNOSIS — I10 HYPERTENSION, ESSENTIAL: ICD-10-CM

## 2025-03-19 DIAGNOSIS — Z95.0 PACEMAKER: Primary | ICD-10-CM

## 2025-03-19 DIAGNOSIS — I34.0 NONRHEUMATIC MITRAL VALVE REGURGITATION: ICD-10-CM

## 2025-03-19 DIAGNOSIS — Z95.0 PACEMAKER: ICD-10-CM

## 2025-03-19 DIAGNOSIS — I48.0 PAROXYSMAL ATRIAL FIBRILLATION: Primary | ICD-10-CM

## 2025-03-19 NOTE — PROGRESS NOTES
[facility] Provider Note     Date:   2025   Name: Jeremias Cross  :   1940  PCP: Gaby Rosen MD    Chief Complaint   Patient presents with    Pacemaker Check    Atrial Fibrillation     Pt states he is here today for follow up atrial fib. No chest pain but does have some SOA. Nothing new.       Subjective     History of Present Illness  Jeremias Cross is a 84 y.o. male who presents today for  atrial fib with pacemaker check.States feels pretty good.  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  Pt has no swelling in lower extremities.  Would like result of last ECHO that he had on the last visit here  Reports no dizziness or lightheadedness. No syncope.  Reports no concerns or problems.      Cardiology/Sleep History  1. Atrial fibrillation - paroxysmal, asymptomatic, on eliquis low dose because of CKD  2. Sinus pauses s/p DC PM 2022 [facility]  3. Hypertension  4. Congestive heart failure - EF 45% on echo 3/22, better on subsequent echos  5. Mitral regurgitation- moderate 3/2024  6. CKD    No Known Allergies    Current Outpatient Medications:     apixaban (ELIQUIS) 2.5 MG tablet tablet, Take 1 tablet by mouth Every 12 (Twelve) Hours., Disp: 28 tablet, Rfl: 0    aspirin 81 MG chewable tablet, Chew 1 tablet Daily. Takes every other day, Disp: , Rfl:     bumetanide (BUMEX) 0.5 MG tablet, TAKE 1 TABLET BY MOUTH EVERY DAY, Disp: 90 tablet, Rfl: 0    dilTIAZem (TIAZAC) 300 MG 24 hr capsule, Daily., Disp: , Rfl:     losartan (COZAAR) 100 MG tablet, Daily., Disp: , Rfl:     metoprolol succinate XL (TOPROL-XL) 25 MG 24 hr tablet, TAKE 1 TABLET BY MOUTH EVERY DAY, Disp: 30 tablet, Rfl: 11    multivitamin with minerals tablet tablet, Take 1 tablet by mouth Daily., Disp: , Rfl:     potassium chloride 10 MEQ CR tablet, Take 1 tablet by mouth 1 (One) Time., Disp: , Rfl:     Past Medical History:   Diagnosis Date    Cataracts, bilateral     Congestive heart  "failure     Hiatal hernia     Hypertension     Kidney disease     Other hypersomnia     Prostate disorder     Skin cancer       Past Surgical History:   Procedure Laterality Date    INGUINAL HERNIA REPAIR Bilateral     PARATHYROIDECTOMY      PROSTATECTOMY      SKIN CANCER EXCISION      TONSILLECTOMY       Family History   Problem Relation Age of Onset    Asthma Mother     Emphysema Mother     Hypertension Brother     Benign prostatic hyperplasia Brother     Arthritis Brother      Social History     Socioeconomic History    Marital status: Single   Tobacco Use    Smoking status: Never     Passive exposure: Never    Smokeless tobacco: Never   Vaping Use    Vaping status: Never Used   Substance and Sexual Activity    Alcohol use: Not Currently    Drug use: Not Currently    Sexual activity: Defer       Objective     Vital Signs:  /70 (BP Location: Right arm, Patient Position: Sitting, Cuff Size: Adult)   Pulse 65   Ht 172.7 cm (68\")   Wt 88 kg (194 lb)   SpO2 95%   BMI 29.50 kg/m²   Estimated body mass index is 29.5 kg/m² as calculated from the following:    Height as of this encounter: 172.7 cm (68\").    Weight as of this encounter: 88 kg (194 lb).         Physical Exam  Constitutional:       Appearance: Normal appearance. He is well-developed.   HENT:      Head: Normocephalic and atraumatic.   Eyes:      General: No scleral icterus.     Pupils: Pupils are equal, round, and reactive to light.   Cardiovascular:      Rate and Rhythm: Normal rate and regular rhythm.      Heart sounds: Normal heart sounds. No murmur heard.  Pulmonary:      Breath sounds: Normal breath sounds. No wheezing or rhonchi.   Musculoskeletal:      Right lower leg: No edema.      Left lower leg: No edema.   Skin:     Capillary Refill: Capillary refill takes less than 2 seconds.      Coloration: Skin is not cyanotic.      Nails: There is no clubbing.   Neurological:      Mental Status: He is alert and oriented to person, place, and time. "      Motor: No weakness.      Gait: Gait normal.   Psychiatric:         Mood and Affect: Mood normal.         Behavior: Behavior is cooperative.         Thought Content: Thought content normal.         Cognition and Memory: Memory normal.                     Assessment and Plan     ASSESSMENTS AND ORDERS  Assessment & Plan  Paroxysmal atrial fibrillation    Nonrheumatic mitral valve regurgitation    Pacemaker

## 2025-03-20 NOTE — PROGRESS NOTES
[facility] Provider Note     Date:   2025   Name: Jeremias Cross  :   1940  PCP: Gaby Rosen MD    Chief Complaint   Patient presents with    Pacemaker Check    Atrial Fibrillation     Pt states he is here today for follow up atrial fib. No chest pain but does have some SOA. Nothing new.       Subjective     History of Present Illness  Jeremias Cross is a 84 y.o. male who presents today for paroxymal atrial fib, HTN and pacemaker check.    States feels pretty good.  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  Pt has no swelling in lower extremities.  Would like result of last ECHO that he had on the last visit here  Reports no dizziness or lightheadedness. No syncope.  Reports no concerns or problems.      Cardiology/Sleep History  1. Atrial fibrillation - paroxysmal, asymptomatic, on eliquis low dose because of CKD  2. Sinus pauses s/p DC PM 2022 [facility]  3. Hypertension  4. Congestive heart failure - EF 45% on echo 3/22, better on subsequent echos  5. Mitral regurgitation- moderate 3/2024  6. CKD    No Known Allergies    Current Outpatient Medications:     apixaban (ELIQUIS) 2.5 MG tablet tablet, Take 1 tablet by mouth Every 12 (Twelve) Hours., Disp: 28 tablet, Rfl: 0    aspirin 81 MG chewable tablet, Chew 1 tablet Daily. Takes every other day, Disp: , Rfl:     bumetanide (BUMEX) 0.5 MG tablet, TAKE 1 TABLET BY MOUTH EVERY DAY, Disp: 90 tablet, Rfl: 0    dilTIAZem (TIAZAC) 300 MG 24 hr capsule, Daily., Disp: , Rfl:     losartan (COZAAR) 100 MG tablet, Daily., Disp: , Rfl:     metoprolol succinate XL (TOPROL-XL) 25 MG 24 hr tablet, TAKE 1 TABLET BY MOUTH EVERY DAY, Disp: 30 tablet, Rfl: 11    multivitamin with minerals tablet tablet, Take 1 tablet by mouth Daily., Disp: , Rfl:     potassium chloride 10 MEQ CR tablet, Take 1 tablet by mouth 1 (One) Time., Disp: , Rfl:     Past Medical History:   Diagnosis Date    Cataracts, bilateral      "Congestive heart failure     Hiatal hernia     Hypertension     Kidney disease     Other hypersomnia     Prostate disorder     Skin cancer       Past Surgical History:   Procedure Laterality Date    INGUINAL HERNIA REPAIR Bilateral     PARATHYROIDECTOMY      PROSTATECTOMY      SKIN CANCER EXCISION      TONSILLECTOMY       Family History   Problem Relation Age of Onset    Asthma Mother     Emphysema Mother     Hypertension Brother     Benign prostatic hyperplasia Brother     Arthritis Brother      Social History     Socioeconomic History    Marital status: Single   Tobacco Use    Smoking status: Never     Passive exposure: Never    Smokeless tobacco: Never   Vaping Use    Vaping status: Never Used   Substance and Sexual Activity    Alcohol use: Not Currently    Drug use: Not Currently    Sexual activity: Defer       Objective     Vital Signs:  /70 (BP Location: Right arm, Patient Position: Sitting, Cuff Size: Adult)   Pulse 65   Ht 172.7 cm (68\")   Wt 88 kg (194 lb)   SpO2 95%   BMI 29.50 kg/m²   Estimated body mass index is 29.5 kg/m² as calculated from the following:    Height as of this encounter: 172.7 cm (68\").    Weight as of this encounter: 88 kg (194 lb).         Physical Exam  Constitutional:       Appearance: Normal appearance. He is well-developed.   HENT:      Head: Normocephalic and atraumatic.   Eyes:      General: No scleral icterus.     Pupils: Pupils are equal, round, and reactive to light.   Cardiovascular:      Rate and Rhythm: Normal rate and regular rhythm.      Heart sounds: Normal heart sounds. No murmur heard.  Pulmonary:      Breath sounds: Normal breath sounds. No wheezing or rhonchi.   Musculoskeletal:      Right lower leg: No edema.      Left lower leg: No edema.   Skin:     Capillary Refill: Capillary refill takes less than 2 seconds.      Coloration: Skin is not cyanotic.      Nails: There is no clubbing.   Neurological:      Mental Status: He is alert and oriented to person, " place, and time.      Motor: No weakness.      Gait: Gait normal.   Psychiatric:         Mood and Affect: Mood normal.         Behavior: Behavior is cooperative.         Thought Content: Thought content normal.         Cognition and Memory: Memory normal.                     Assessment and Plan     ASSESSMENTS AND ORDERS  Assessment & Plan  Paroxysmal atrial fibrillation    Nonrheumatic mitral valve regurgitation    Orders:    Adult Transthoracic Echo Complete W/ Cont if Necessary Per Protocol; Future    Pacemaker    Hypertension, essential           PLAN  - continue renally dose NOAC for PAF, doing well with rate control strategy  - annual mitral valve surveillance echos  - BP is well controlled on current medications  - PM interrogation showed good battery life and lead function            Follow Up  Return in about 6 months (around 9/19/2025) for PM/ICD check.    CLEMENTINA Valera MD  Cardiology and [facility]  03/19/2025     Please note that this explicitly excludes time spent on other separate billable services such as performing procedures or test interpretation, when applicable.    This note was created using dictation software which occasionally transcribes nonsensical phrases. Please contact the provider if any clarification is needed.

## 2025-07-03 LAB
MDC_IDC_MSMT_BATTERY_REMAINING_LONGEVITY: 86 MO
MDC_IDC_MSMT_BATTERY_REMAINING_PERCENTAGE: 74 %
MDC_IDC_MSMT_BATTERY_RRT_TRIGGER: 2.6
MDC_IDC_MSMT_BATTERY_STATUS: NORMAL
MDC_IDC_MSMT_BATTERY_VOLTAGE: 3.01
MDC_IDC_MSMT_LEADCHNL_RA_DTM: NORMAL
MDC_IDC_MSMT_LEADCHNL_RA_IMPEDANCE_VALUE: 450
MDC_IDC_MSMT_LEADCHNL_RA_PACING_THRESHOLD_AMPLITUDE: 0.75
MDC_IDC_MSMT_LEADCHNL_RA_PACING_THRESHOLD_POLARITY: NORMAL
MDC_IDC_MSMT_LEADCHNL_RA_PACING_THRESHOLD_PULSEWIDTH: 0.5
MDC_IDC_MSMT_LEADCHNL_RA_SENSING_INTR_AMPL: 3.5
MDC_IDC_MSMT_LEADCHNL_RV_DTM: NORMAL
MDC_IDC_MSMT_LEADCHNL_RV_IMPEDANCE_VALUE: 480
MDC_IDC_MSMT_LEADCHNL_RV_PACING_THRESHOLD_AMPLITUDE: 0.75
MDC_IDC_MSMT_LEADCHNL_RV_PACING_THRESHOLD_POLARITY: NORMAL
MDC_IDC_MSMT_LEADCHNL_RV_PACING_THRESHOLD_PULSEWIDTH: 0.5
MDC_IDC_MSMT_LEADCHNL_RV_SENSING_INTR_AMPL: 12
MDC_IDC_PG_IMPLANT_DTM: NORMAL
MDC_IDC_PG_MFG: NORMAL
MDC_IDC_PG_MODEL: NORMAL
MDC_IDC_PG_SERIAL: NORMAL
MDC_IDC_PG_TYPE: NORMAL
MDC_IDC_SESS_DTM: NORMAL
MDC_IDC_SESS_TYPE: NORMAL
MDC_IDC_SET_BRADY_AT_MODE_SWITCH_RATE: 180
MDC_IDC_SET_BRADY_LOWRATE: 60
MDC_IDC_SET_BRADY_MAX_SENSOR_RATE: 120
MDC_IDC_SET_BRADY_MAX_TRACKING_RATE: 120
MDC_IDC_SET_BRADY_MODE: NORMAL
MDC_IDC_SET_BRADY_PAV_DELAY: 275
MDC_IDC_SET_BRADY_SAV_DELAY: 250
MDC_IDC_SET_LEADCHNL_RA_PACING_AMPLITUDE: 1.75
MDC_IDC_SET_LEADCHNL_RA_PACING_POLARITY: NORMAL
MDC_IDC_SET_LEADCHNL_RA_PACING_PULSEWIDTH: 0.5
MDC_IDC_SET_LEADCHNL_RA_SENSING_POLARITY: NORMAL
MDC_IDC_SET_LEADCHNL_RA_SENSING_SENSITIVITY: 0.5
MDC_IDC_SET_LEADCHNL_RV_PACING_AMPLITUDE: 1
MDC_IDC_SET_LEADCHNL_RV_PACING_POLARITY: NORMAL
MDC_IDC_SET_LEADCHNL_RV_PACING_PULSEWIDTH: 0.5
MDC_IDC_SET_LEADCHNL_RV_SENSING_POLARITY: NORMAL
MDC_IDC_SET_LEADCHNL_RV_SENSING_SENSITIVITY: 2
MDC_IDC_STAT_AT_BURDEN_PERCENT: 22
MDC_IDC_STAT_BRADY_RA_PERCENT_PACED: 70
MDC_IDC_STAT_BRADY_RV_PERCENT_PACED: 14
